# Patient Record
Sex: FEMALE | Race: WHITE | Employment: PART TIME | URBAN - NONMETROPOLITAN AREA
[De-identification: names, ages, dates, MRNs, and addresses within clinical notes are randomized per-mention and may not be internally consistent; named-entity substitution may affect disease eponyms.]

---

## 2024-07-04 ENCOUNTER — HOSPITAL ENCOUNTER (EMERGENCY)
Age: 34
Discharge: HOME | End: 2024-07-04
Attending: EMERGENCY MEDICINE
Payer: MEDICAID

## 2024-07-04 VITALS
TEMPERATURE: 98.1 F | SYSTOLIC BLOOD PRESSURE: 128 MMHG | RESPIRATION RATE: 14 BRPM | DIASTOLIC BLOOD PRESSURE: 90 MMHG | OXYGEN SATURATION: 98 % | HEART RATE: 86 BPM

## 2024-07-04 DIAGNOSIS — L50.9 URTICARIA: Primary | ICD-10-CM

## 2024-07-04 PROCEDURE — 99283 EMERGENCY DEPT VISIT LOW MDM: CPT

## 2024-07-04 RX ORDER — TIZANIDINE 4 MG/1
4 TABLET ORAL 2 TIMES DAILY
COMMUNITY

## 2024-07-04 RX ORDER — FAMOTIDINE 20 MG/1
20 TABLET, FILM COATED ORAL 2 TIMES DAILY
Qty: 20 TABLET | Refills: 0 | Status: SHIPPED | OUTPATIENT
Start: 2024-07-04 | End: 2024-07-14

## 2024-07-04 RX ORDER — METHYLPREDNISOLONE 4 MG/1
TABLET ORAL
Qty: 1 KIT | Refills: 0 | Status: SHIPPED | OUTPATIENT
Start: 2024-07-04 | End: 2024-07-10

## 2024-07-04 RX ORDER — DIPHENHYDRAMINE HCL 25 MG
25 CAPSULE ORAL EVERY 4 HOURS PRN
Qty: 1 CAPSULE | Refills: 0 | Status: SHIPPED | OUTPATIENT
Start: 2024-07-04 | End: 2024-07-14

## 2024-07-04 RX ORDER — BUSPIRONE HYDROCHLORIDE 5 MG/1
5 TABLET ORAL 3 TIMES DAILY
COMMUNITY

## 2024-07-04 ASSESSMENT — ENCOUNTER SYMPTOMS
BACK PAIN: 0
VOMITING: 0
BLOOD IN STOOL: 0
COUGH: 0
DIARRHEA: 0
SHORTNESS OF BREATH: 0
EYE PAIN: 0
NAUSEA: 0
ABDOMINAL PAIN: 0
CONSTIPATION: 0

## 2024-07-04 ASSESSMENT — LIFESTYLE VARIABLES: HOW OFTEN DO YOU HAVE A DRINK CONTAINING ALCOHOL: NEVER

## 2024-07-04 NOTE — ED PROVIDER NOTES
[facility]  eMERGENCY dEPARTMENT eNCOUnter      Pt Name: Becca Ledezma  MRN: [id number]  Birthdate 1990  Date of evaluation: 7/4/2024      CHIEF COMPLAINT       Chief Complaint   Patient presents with    Rash         HISTORY OF PRESENT ILLNESS    Becca Ledezma is a 34 y.o. female who presents with chief complaint of a rash that started this morning she thinks it may have been some mosquitoes her fiancé let into the house she is itching Benadryl has helped mostly swelling to her buttock her face and splotchy rashes to her arms that itch no difficulty breathing no difficulty swallowing  Patient's had a reaction to sulfa before but can think of nothing else new in her life and has not taken sulfa drugs    REVIEW OF SYSTEMS         Review of Systems   Constitutional:  Negative for chills and fever.   HENT:  Negative for congestion and ear pain.    Eyes:  Negative for pain and visual disturbance.   Respiratory:  Negative for cough and shortness of breath.    Cardiovascular:  Negative for chest pain, palpitations and leg swelling.   Gastrointestinal:  Negative for abdominal pain, blood in stool, constipation, diarrhea, nausea and vomiting.   Endocrine: Negative for polydipsia and polyuria.   Genitourinary:  Negative for difficulty urinating, dysuria, frequency, vaginal bleeding and vaginal discharge.   Musculoskeletal:  Negative for back pain, joint swelling, myalgias, neck pain and neck stiffness.   Skin:  Positive for rash.   Neurological:  Negative for dizziness, weakness and headaches.   Hematological:  Negative for adenopathy. Does not bruise/bleed easily.   Psychiatric/Behavioral:  Negative for confusion, self-injury and suicidal ideas.          PAST MEDICAL HISTORY    has no past medical history on file.    SURGICAL HISTORY      has no past surgical history on file.    CURRENT MEDICATIONS       Previous Medications    BUPROPION HCL (WELLBUTRIN PO)    Take 150 mg by mouth daily    BUSPIRONE (BUSPAR) 5

## 2025-06-02 ENCOUNTER — TRANSCRIBE ORDERS (OUTPATIENT)
Dept: PHYSICAL THERAPY | Age: 35
End: 2025-06-02

## 2025-06-02 DIAGNOSIS — M35.7 HYPERMOBILITY SYNDROME: Primary | ICD-10-CM

## 2025-06-04 ASSESSMENT — RHEUMATOLOGY NEW PATIENT QUESTIONNAIRE
COUGH: N
MUSCLE WEAKNESS: Y
EASY BRUISING: Y
PERSISTENT DIARRHEA: N
EYE REDNESS: N
HOW WOULD YOU DESCRIBE YOUR STIFFNESS ON AVERAGE: MODERATE
NIGHT SWEATS: N
UNUSUAL BLEEDING: N
DRYNESS OF MOUTH: N
COLOR CHANGES OF HANDS OR FEET IN THE COLD: Y
DIFFICULTY SWALLOWING: Y
DOUBLE OR BLURRED VISION: N
JOINT SWELLING: N
SORES IN MOUTH OR NOSE: N
VOMITING OF BLOOD OR COFFEE GROUND CONSISTENCY MATERIAL: N
BLACK STOOLS: N
EYE DRYNESS: N
HEARTBURN OR REFLUX: Y
ANEMIA: Y
HOARSE VOICE: N
RASH OR ULCERS: N
SKIN REDNESS: N
DIFFICULTY FALLING ASLEEP: Y
AGITATION: Y
SEIZURES: N
INCREASED SUSCEPTIBILITY TO INFECTION: N
LOSS OF CONSCIOUSNESS: N
STOMACH PAIN: N
UNUSUAL FATIGUE: Y
UNUSUALLY RAPID OR SLOWED HEART RATE: N
NODULES/BUMPS: Y
EYE PAIN: N
NUMBNESS OR TINGLING IN HANDS OR FEET: Y
DIFFICULTY STAYING ASLEEP: Y
MEMORY LOSS: N
SHORTNESS OF BREATH: N
SWOLLEN OR TENDER GLANDS: N
JAUNDICE: N
ANXIETY: Y
DIFFICULTY BREATHING LYING DOWN: N
BLOOD IN STOOLS: N
JOINT PAIN: Y
UNEXPLAINED HEARING LOSS: N
NAUSEA: N
ABNORMAL URINE: N
SWOLLEN LEGS OR FEET: N
SUN SENSITIVE (SUN ALLERGY): N
DEPRESSION: N
EXCESSIVE HAIR LOSS (MORE THAN YOUR NORM): N
FAINTING: Y
VAGINAL DRYNESS: N
LOSS OF VISION: N
BEHAVIORAL CHANGES: N
CHEST PAIN: N
HEADACHES: Y
EASILY LOSING TEMPER: N
MORNING STIFFNESS IN LOWER BACK: Y
MORNING STIFFNESS: Y
UNEXPLAINED WEIGHT CHANGE: N
PAIN OR BURNING ON URINATION: N
RASH: N
FEVER: N

## 2025-06-05 ENCOUNTER — LAB (OUTPATIENT)
Dept: LAB | Age: 35
End: 2025-06-05

## 2025-06-05 ENCOUNTER — OFFICE VISIT (OUTPATIENT)
Age: 35
End: 2025-06-05
Payer: MEDICAID

## 2025-06-05 VITALS
WEIGHT: 149.6 LBS | BODY MASS INDEX: 24.93 KG/M2 | DIASTOLIC BLOOD PRESSURE: 70 MMHG | HEART RATE: 90 BPM | OXYGEN SATURATION: 98 % | SYSTOLIC BLOOD PRESSURE: 108 MMHG | HEIGHT: 65 IN

## 2025-06-05 DIAGNOSIS — M25.50 MULTIPLE JOINT PAIN: Primary | ICD-10-CM

## 2025-06-05 DIAGNOSIS — M25.50 MULTIPLE JOINT PAIN: ICD-10-CM

## 2025-06-05 LAB
ALBUMIN SERPL BCG-MCNC: 4.3 G/DL (ref 3.4–4.9)
ALP SERPL-CCNC: 67 U/L (ref 38–126)
ALT SERPL W/O P-5'-P-CCNC: 11 U/L (ref 10–35)
ANION GAP SERPL CALC-SCNC: 12 MEQ/L (ref 8–16)
AST SERPL-CCNC: 18 U/L (ref 10–35)
BASOPHILS ABSOLUTE: 0 THOU/MM3 (ref 0–0.1)
BASOPHILS NFR BLD AUTO: 0.5 %
BILIRUB SERPL-MCNC: 0.3 MG/DL (ref 0.3–1.2)
BUN SERPL-MCNC: 11 MG/DL (ref 8–23)
CALCIUM SERPL-MCNC: 9.2 MG/DL (ref 8.6–10)
CHLORIDE SERPL-SCNC: 105 MEQ/L (ref 98–111)
CO2 SERPL-SCNC: 24 MEQ/L (ref 22–29)
CREAT SERPL-MCNC: 0.9 MG/DL (ref 0.5–0.9)
CRP SERPL-MCNC: < 0.3 MG/DL (ref 0–0.5)
DEPRECATED RDW RBC AUTO: 39.4 FL (ref 35–45)
EOSINOPHIL NFR BLD AUTO: 1.4 %
EOSINOPHILS ABSOLUTE: 0.1 THOU/MM3 (ref 0–0.4)
ERYTHROCYTE [DISTWIDTH] IN BLOOD BY AUTOMATED COUNT: 11.8 % (ref 11.5–14.5)
ERYTHROCYTE [SEDIMENTATION RATE] IN BLOOD BY WESTERGREN METHOD: 6 MM/HR (ref 0–20)
GFR SERPL CREATININE-BSD FRML MDRD: 87 ML/MIN/1.73M2
GLUCOSE SERPL-MCNC: 92 MG/DL (ref 74–109)
HCT VFR BLD AUTO: 39.5 % (ref 37–47)
HGB BLD-MCNC: 13.3 GM/DL (ref 12–16)
IMM GRANULOCYTES # BLD AUTO: 0.04 THOU/MM3 (ref 0–0.07)
IMM GRANULOCYTES NFR BLD AUTO: 0.4 %
LYMPHOCYTES ABSOLUTE: 2.3 THOU/MM3 (ref 1–4.8)
LYMPHOCYTES NFR BLD AUTO: 23.9 %
MCH RBC QN AUTO: 30.6 PG (ref 26–33)
MCHC RBC AUTO-ENTMCNC: 33.7 GM/DL (ref 32.2–35.5)
MCV RBC AUTO: 91 FL (ref 81–99)
MONOCYTES ABSOLUTE: 0.5 THOU/MM3 (ref 0.4–1.3)
MONOCYTES NFR BLD AUTO: 5.2 %
NEUTROPHILS ABSOLUTE: 6.5 THOU/MM3 (ref 1.8–7.7)
NEUTROPHILS NFR BLD AUTO: 68.6 %
NRBC BLD AUTO-RTO: 0 /100 WBC
PLATELET # BLD AUTO: 316 THOU/MM3 (ref 130–400)
PMV BLD AUTO: 11.6 FL (ref 9.4–12.4)
POTASSIUM SERPL-SCNC: 3.5 MEQ/L (ref 3.5–5.2)
PROT SERPL-MCNC: 7.1 G/DL (ref 6.4–8.3)
RBC # BLD AUTO: 4.34 MILL/MM3 (ref 4.2–5.4)
SODIUM SERPL-SCNC: 141 MEQ/L (ref 135–145)
WBC # BLD AUTO: 9.5 THOU/MM3 (ref 4.8–10.8)

## 2025-06-05 PROCEDURE — 99203 OFFICE O/P NEW LOW 30 MIN: CPT | Performed by: INTERNAL MEDICINE

## 2025-06-05 PROCEDURE — 99204 OFFICE O/P NEW MOD 45 MIN: CPT | Performed by: INTERNAL MEDICINE

## 2025-06-05 RX ORDER — LORATADINE 10 MG/1
10 TABLET ORAL PRN
COMMUNITY

## 2025-06-05 RX ORDER — DICLOFENAC SODIUM 75 MG/1
75 TABLET, DELAYED RELEASE ORAL 2 TIMES DAILY
COMMUNITY

## 2025-06-05 ASSESSMENT — ENCOUNTER SYMPTOMS
NAUSEA: 0
SHORTNESS OF BREATH: 0
COUGH: 0
ABDOMINAL PAIN: 0
VOMITING: 0

## 2025-06-05 NOTE — PROGRESS NOTES
[facility]   Rheumatology Clinic Note      6/5/2025       Reason for Consult:  joint pain  Requesting Physician:  Daphney Romero APRN *     CHIEF COMPLAINT:    Chief Complaint   Patient presents with    New Patient     Joint pain    Joint Pain     Bilateral wrist, ankles c Left ankle worsened, bilateral shoulders c Rt being worsened area,.  Wrist present for 12 years. Had an EMG completed (negative)  Chronic fracture L5  - pain level 6         HISTORY OF PRESENT ILLNESS:    34 y.o. female presents today for evaluation of joint pain.   Pain in wrists (right worse than left) since high school (around 15 years).  Gripping and twisting movements of wrist aggravates the pain. Associated with swelling.    Pain in ankles, left worse than right. Popping. H/o spraining her ankles multiple time when she was in school playing soccer.  Pain in shoulders. Associated with popping and feeling of shoulder getting out of place. Reports that her fiance pushes her shoulder in place.    Chronic fracture in L5, wears a back brace.     Wakes up with stiffness in her joints. Feels like this lasts all day.     Reports having eczema in the face. Uses topical steroid that help.  No other skin rashes.  No oral ulcers.  No Raynaud's.  No SOB.      Past Medical History:     has no past medical history on file.    Past Surgical History:     has no past surgical history on file.    Current Medications:      Current Outpatient Medications:     busPIRone (BUSPAR) 5 MG tablet, Take 1 tablet by mouth 3 times daily, Disp: , Rfl:     tiZANidine (ZANAFLEX) 4 MG tablet, Take 1 tablet by mouth in the morning and at bedtime Pt states she takes at bedtime, Disp: , Rfl:     Cholecalciferol (VITAMIN D-3 PO), Take by mouth daily, Disp: , Rfl:     loratadine (CLARITIN) 10 MG tablet, Take 1 tablet by mouth as needed, Disp: , Rfl:     diclofenac (VOLTAREN) 75 MG EC tablet, Take 1 tablet by mouth 2 times daily, Disp: , Rfl:     famotidine (PEPCID)

## 2025-06-11 ENCOUNTER — HOSPITAL ENCOUNTER (OUTPATIENT)
Dept: PHYSICAL THERAPY | Age: 35
Setting detail: THERAPIES SERIES
Discharge: HOME | End: 2025-06-11
Payer: MEDICAID

## 2025-06-11 PROCEDURE — 97163 PT EVAL HIGH COMPLEX 45 MIN: CPT | Performed by: PHYSICAL THERAPIST

## 2025-06-11 ASSESSMENT — PAIN DESCRIPTION - ORIENTATION: ORIENTATION: RIGHT;LEFT

## 2025-06-11 ASSESSMENT — PAIN SCALES - GENERAL: PAINLEVEL_OUTOF10: 6

## 2025-06-11 ASSESSMENT — PAIN DESCRIPTION - DESCRIPTORS: DESCRIPTORS: TIGHTNESS

## 2025-06-11 ASSESSMENT — PAIN DESCRIPTION - LOCATION: LOCATION: BACK;ANKLE;SHOULDER

## 2025-06-11 ASSESSMENT — PAIN DESCRIPTION - PAIN TYPE: TYPE: CHRONIC PAIN

## 2025-06-11 NOTE — FLOWSHEET NOTE
motor skill, proprioception.  (34197)    Manual Treatments:    [] Provided manual therapy to mobilize soft tissue/joints for the purpose of modulating pain, promoting relaxation,  increasing ROM, reducing/eliminating soft tissue swelling/inflammation/restriction, improving soft tissue extensibility. (11263)    Service Based Modalities:      Timed Code Treatment Minutes:       Total Treatment Minutes:   50    Treatment/Activity Tolerance:  [] Patient tolerated treatment well [] Patient limited by fatique  [x] Patient limited by pain  [] Patient limited by other medical complications  [] Other:     Prognosis: [] Good [x] Fair  [] Poor    Patient Requires Follow-up: [x] Yes  [] No    Goals:  Short Term Goals  Time Frame for Short Term Goals: 3 weeks  Short Term Goal 1: Initaite HEP    Long Term Goals  Time Frame for Long Term Goals : 6 weeks  Long Term Goal 1: Indpendent in HEP  Long Term Goal 2: Improve LE/core strength to 5/5 to improve joint protection and reduce instability.  Long Term Goal 3: Patient to note pain at worst 7/10 with work tasks.  Long Term Goal 4: Reduce Foot Function Index to 35% DIsabiltiy or less to improve toelrance with ADLs    Plan:   [] Continue per plan of care [] Alter current plan (see comments)  [x] Plan of care initiated [] Hold pending MD visit [] Discharge    Plan for Next Session:  Requesting OT referral for bilateral hand pain     Electronically signed by:  Jordan Schmidt, PT

## 2025-06-11 NOTE — PROGRESS NOTES
Pain UE/LE    Therapy Time:   Individual Concurrent Group Co-treatment   Time In 1430         Time Out 1520         Minutes 50                 Jordan Schmidt, PT

## 2025-06-11 NOTE — PLAN OF CARE
Potential: [] Excellent [x] Good [] Fair  [] Poor     Electronically signed by:  Jordan Schmidt PT    If you have any questions or concerns, please don't hesitate to call.  Thank you for your referral.      Physician Signature:________________________________Date:__________________  By signing above, therapist’s plan is approved by physician

## 2025-06-12 ENCOUNTER — TRANSCRIBE ORDERS (OUTPATIENT)
Dept: PHYSICAL THERAPY | Age: 35
End: 2025-06-12

## 2025-06-12 DIAGNOSIS — M25.531 PAIN IN RIGHT WRIST: Primary | ICD-10-CM

## 2025-06-18 ENCOUNTER — HOSPITAL ENCOUNTER (OUTPATIENT)
Dept: PHYSICAL THERAPY | Age: 35
Setting detail: THERAPIES SERIES
Discharge: HOME | End: 2025-06-18
Payer: MEDICAID

## 2025-06-18 PROCEDURE — 97110 THERAPEUTIC EXERCISES: CPT

## 2025-06-18 NOTE — FLOWSHEET NOTE
Physical Therapy Daily Treatment Note    Date:  2025    Patient Name:  Becca Ledezma    :  1990  MRN: [id number]  Restrictions/Precautions:     Medical/Treatment Diagnosis Information:     Hypermobility syndrome [M35.7]      Insurance/Certification information:     Physician Information:    Daphney Romero APRN   Plan of care signed (Y/N):  n  Visit# / total visits:  2/10  Pain level: 6/10       Time In: 9:30  Time Out: 10:13    Progress Note: []  Yes  [x]  No  Next due by: Visit #10  Or by    Subjective:   Pt reports brought cane to learn how to use it. States Monday at work was in a lot of pain. Pain was shooting from back to L foot. 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     Objective: EDMUNDO performed per flow sheet for increased mobility and strengthening for improvement in completion of ADLs. Verbal cueing for sequencing and proper form. Gait trained with straight point cane.counter hep given     Observation:   Test measurements:      Exercises: Focus on joint protection UE/LE  Exercise/Equipment Resistance/Repetitions Other comments        Nustep  5'    Counter Exs on foam  10x 3 way, HR/TR, HS curls, marches     Squat  10x     Step ups 10x 4\" F, L         Side Stepping  3 laps     Tandem Walking  3 laps     Retro walking  2 laps          Standing Foam            Ankle isometrics 10x3\"     Shoulder isometrics  10x3\"      [] Provided verbal/tactile cueing for activities related to strengthening, flexibility, endurance, ROM. (05061)  [] Provided verbal/tactile cueing for activities related to improving balance, coordination, kinesthetic sense, posture, motor skill, proprioception. (35935)    Therapeutic Activities:     [] Therapeutic activities, direct (one-on-one) patient contact (use of dynamic activities to improve functional performance). (66215)    Gait:   [] Provided training and instruction to the patient for ambulation re-education. (73454)    Self-Care/ADL's  []

## 2025-06-25 ENCOUNTER — HOSPITAL ENCOUNTER (OUTPATIENT)
Dept: PHYSICAL THERAPY | Age: 35
Setting detail: THERAPIES SERIES
Discharge: HOME | End: 2025-06-25
Payer: MEDICAID

## 2025-06-25 PROCEDURE — 97110 THERAPEUTIC EXERCISES: CPT

## 2025-06-25 NOTE — FLOWSHEET NOTE
Physical Therapy Daily Treatment Note    Date:  2025    Patient Name:  Becca Ledezma    :  1990  MRN: [id number]  Restrictions/Precautions:     Medical/Treatment Diagnosis Information:     Hypermobility syndrome [M35.7]      Insurance/Certification information:     Physician Information:    Daphney Romero APRN   Plan of care signed (Y/N):  n  Visit# / total visits:  3/10  Pain level: 6/10       Time In: 8:46  Time Out: 9:33    Progress Note: []  Yes  [x]  No  Next due by: Visit #10  Or by  25    Subjective:   States running late and forgot cane. Woke up with more shoulder pain however feels better now. Does not think was very sore after last session.     Objective: EDMUNDO performed per flow sheet for increased mobility and strengthening for improvement in completion of ADLs. Verbal cueing for sequencing and proper form. Initiated several new exercises with good tolerance. Balance exercises show most difficulty    Observation:   Test measurements:      Exercises: Focus on joint protection UE/LE  Exercise/Equipment Resistance/Repetitions Other comments        Nustep  5'    Counter Exs on foam  15x 3 way, HR/TR, HS curls, marches     Squat  10x 3 way    Step ups 10x 4\" F, L         Side Stepping  3 laps     Tandem Walking  3 laps     Retro walking  2 laps          Standing Foam  2x30\" FTEO, FAEC         Ankle isometrics 10x3\"     Shoulder isometrics  10x3\"      [] Provided verbal/tactile cueing for activities related to strengthening, flexibility, endurance, ROM. (29845)  [] Provided verbal/tactile cueing for activities related to improving balance, coordination, kinesthetic sense, posture, motor skill, proprioception. (56029)    Therapeutic Activities:     [] Therapeutic activities, direct (one-on-one) patient contact (use of dynamic activities to improve functional performance). (39967)    Gait:   [] Provided training and instruction to the patient for ambulation re-education.

## 2025-07-01 ENCOUNTER — OFFICE VISIT (OUTPATIENT)
Dept: PRIMARY CARE CLINIC | Age: 35
End: 2025-07-01
Payer: MEDICAID

## 2025-07-01 VITALS
HEART RATE: 89 BPM | TEMPERATURE: 97.4 F | OXYGEN SATURATION: 98 % | DIASTOLIC BLOOD PRESSURE: 80 MMHG | HEIGHT: 65 IN | SYSTOLIC BLOOD PRESSURE: 110 MMHG | BODY MASS INDEX: 25.16 KG/M2 | WEIGHT: 151 LBS

## 2025-07-01 DIAGNOSIS — H10.13 ALLERGIC CONJUNCTIVITIS OF BOTH EYES: Primary | ICD-10-CM

## 2025-07-01 DIAGNOSIS — M79.89 SWELLING OF LEFT HAND: ICD-10-CM

## 2025-07-01 PROCEDURE — 99203 OFFICE O/P NEW LOW 30 MIN: CPT | Performed by: PHYSICIAN ASSISTANT

## 2025-07-01 PROCEDURE — 99212 OFFICE O/P EST SF 10 MIN: CPT | Performed by: PHYSICIAN ASSISTANT

## 2025-07-01 RX ORDER — OLOPATADINE HYDROCHLORIDE 2 MG/ML
1 SOLUTION OPHTHALMIC DAILY
Qty: 1.5 ML | Refills: 0 | Status: SHIPPED | OUTPATIENT
Start: 2025-07-01

## 2025-07-01 SDOH — ECONOMIC STABILITY: FOOD INSECURITY: WITHIN THE PAST 12 MONTHS, YOU WORRIED THAT YOUR FOOD WOULD RUN OUT BEFORE YOU GOT MONEY TO BUY MORE.: NEVER TRUE

## 2025-07-01 SDOH — ECONOMIC STABILITY: FOOD INSECURITY: WITHIN THE PAST 12 MONTHS, THE FOOD YOU BOUGHT JUST DIDN'T LAST AND YOU DIDN'T HAVE MONEY TO GET MORE.: NEVER TRUE

## 2025-07-01 ASSESSMENT — PATIENT HEALTH QUESTIONNAIRE - PHQ9
SUM OF ALL RESPONSES TO PHQ QUESTIONS 1-9: 0
SUM OF ALL RESPONSES TO PHQ QUESTIONS 1-9: 0
1. LITTLE INTEREST OR PLEASURE IN DOING THINGS: NOT AT ALL
SUM OF ALL RESPONSES TO PHQ QUESTIONS 1-9: 0
SUM OF ALL RESPONSES TO PHQ QUESTIONS 1-9: 0
2. FEELING DOWN, DEPRESSED OR HOPELESS: NOT AT ALL

## 2025-07-01 ASSESSMENT — ENCOUNTER SYMPTOMS
EYE DISCHARGE: 0
BLURRED VISION: 0
DIARRHEA: 0
EYE ITCHING: 1
WHEEZING: 0
VOMITING: 0
FOREIGN BODY SENSATION: 1
NAUSEA: 0
EYE REDNESS: 0
EYE PAIN: 0
SHORTNESS OF BREATH: 0

## 2025-07-01 NOTE — PROGRESS NOTES
General: Swelling (mild swelling of left hand) present. No tenderness, deformity or signs of injury.   Skin:     General: Skin is warm and dry.   Neurological:      General: No focal deficit present.      Mental Status: She is alert and oriented to person, place, and time.   Psychiatric:         Mood and Affect: Mood normal.         Behavior: Behavior normal.       Vitals:    07/01/25 0848   BP: 110/80   Pulse: 89   Temp: 97.4 °F (36.3 °C)   TempSrc: Tympanic   SpO2: 98%   Weight: 68.5 kg (151 lb)   Height: 1.651 m (5' 5\")         Assessment:       Diagnosis Orders   1. Allergic conjunctivitis of both eyes        2. Swelling of left hand           On physical exam, the patient has mild swelling of the left hand with no deformity, erythema, wounds, or tenderness. The patient is currently being evaluated by Rheumatology for autoimmune symptoms she has been experiencing. Advised patient to follow up with Rheumatology about the swelling as she has an appointment tomorrow and the swelling may be due to an autoimmune response. The patient also has mild erythema and cobblestoning of her bilateral lower eyelids. The patient has a history of seasonal allergies. Based on this, the patient is being treated for allergic conjunctivitis. Pataday optic solution was prescribed. Advised patient to use warm/cold compresses on eyes as needed. Patient agrees with plan of care. Advised patient to follow up with PCP or walk in if symptoms persist or worsen.           Plan:   Assessment & Plan   Return if symptoms worsen or fail to improve.     No orders of the defined types were placed in this encounter.    Orders Placed This Encounter   Medications    olopatadine (PATADAY) 0.2 % SOLN ophthalmic solution     Sig: Place 1 drop into both eyes daily     Dispense:  1.5 mL     Refill:  0        Patient given educational materials - see patient instructions. Discussed use, benefits, and side effects of prescribed medications with patient.

## 2025-07-02 ENCOUNTER — HOSPITAL ENCOUNTER (OUTPATIENT)
Dept: OCCUPATIONAL THERAPY | Age: 35
Setting detail: THERAPIES SERIES
Discharge: HOME | End: 2025-07-02
Payer: MEDICAID

## 2025-07-02 DIAGNOSIS — M25.531 PAIN IN RIGHT WRIST: Primary | ICD-10-CM

## 2025-07-02 DIAGNOSIS — M25.531 BILATERAL WRIST PAIN: ICD-10-CM

## 2025-07-02 DIAGNOSIS — M25.532 BILATERAL WRIST PAIN: ICD-10-CM

## 2025-07-02 PROCEDURE — 97166 OT EVAL MOD COMPLEX 45 MIN: CPT | Performed by: OCCUPATIONAL THERAPIST

## 2025-07-02 PROCEDURE — 97760 ORTHOTIC MGMT&TRAING 1ST ENC: CPT | Performed by: OCCUPATIONAL THERAPIST

## 2025-07-02 ASSESSMENT — 9 HOLE PEG TEST
TESTTIME_SECONDS: 20
TESTTIME_SECONDS: 26
TEST_RESULT: IMPAIRED
TEST_RESULT: IMPAIRED

## 2025-07-02 NOTE — PLAN OF CARE
Occupational Therapy     [place]  Phone: [phone]  Fax: [phone]             To:  Daphney Romero, APRN - NP      Patient: Becca Ledezma  : 1990  MRN: [id number]  Evaluation Date: 2025      Diagnosis Information:  Diagnosis: Pain in right wrist   OT Insurance Information: [employer] [place] Medicaid     Occupational Therapy Certification/Re-Certification Form  Dear Daphney Romero  The following patient has been evaluated for occupational therapy services and for therapy to continue, insurance requires physician review of the treatment plan. Please review the attached evaluation and/or summary of the patient's plan of care, and verify that you agree therapy should continue by signing the attached document and sending it back to our office.    Plan of Care/Treatment to date:25-25    [x] Therapeutic Exercise    [] Modalities:  [x] Therapeutic Activity    [x] Ultrasound  [x] Electrical Stimulation   [x] Activities of Daily Living    [x] Paraffin   [x] Kinesiotaping  [] Neuromuscular Re-education   [x] Iontophoresis [x] Coldpack/hotpack   [x] Instruction in HEP     [x] Orthotics/splint []   [x] Manual Therapy       [] Aquatic Therapy            Frequency/Duration:    # Days per week: [] 1 day # Weeks: [] 1 week [] 5 weeks      [x] 2 days   [] 2 weeks [x] 6 weeks     [] 3 days   [] 3 weeks [] 7 weeks     [] 4 days   [] 4 weeks [] 8 weeks  Goals:  Short Term Goals  Time Frame for Short Term Goals: 2 weeks  Short Term Goal 1: Orthotics for support and positioning as needed  Short Term Goal 2: Patient education adaptive equipment and technique for increased ease I/ADLs  Short Term Goal 3: Patient education edema management techniques   Long Term Goals  Time Frame for Long Term Goals : 6 weeks  Long Term Goal 1: Patient to demonstrate increased strength BUE: R hand  > 70#, L hand  > 50# for improved I/ADLs  Long Term Goal 2: Patient to demonstrate improved FMC with B 9HPT < 19s  Long Term Goal 3:

## 2025-07-02 NOTE — FLOWSHEET NOTE
[facility] [place] [facility]  Rehabilitation and Sports Medicine    [place]  Phone: [phone]  Fax: [phone]          Occupational Therapy Daily Treatment Note  Date:  2025    Patient Name:  Becca Ledezma    :  1990  MRN: [id number]  Restrictions/Precautions:      Medical/Treatment Diagnosis Information:   Diagnosis: Pain in right wrist   Insurance/Certification information:OT Insurance Information: [employer] [place] Medicaid  Physician Information:  Daphney Romero APRN - NP  Plan of care signed (Y/N):  n    Visit# / total visits:      Pain level: 4/10      Progress Note: [x]  Yes  []  No  Next due by: Visit #10      Date of evaluation/re-evaluation: 25-25    Time In:  405  Time Out:  500    Subjective:     See progress note    Objective/Assessment:   OT eval complete, see progress note    Patient stated (and showed picture of previous day) L hand increased edema. Edema appears to be resolved today. Patient fitted and issued isotoner glove (open finger) for L hand to use at night and prn daytime.  Patient fitted and issued cock up splint R hand/wrist, patient to wear at night and prn daytime. Patient is able to don and doff.    Exercises:   Exercise/Equipment Resistance/Repetitions Other comments                                                                          Therapeutic Exercise  [] Provided verbal/tactile cueing for activities related to strengthening, flexibility, endurance, ROM. (44617)  Neuro  Re-Ed  [] Provided verbal/tactile cueing for activities related to improving balance, coordination, kinesthetic sense, posture, motor skill, proprioception. (21433)     Therapeutic Activities/ADL:   [] Provided use of dynamic activities to improve functional performance (77633)  [] Provided self-care/home management training for activities of daily living and compensatory training (24388)     Manual Treatments:   [] Provided manual therapy to mobilize soft tissue/joints for the purpose of

## 2025-07-02 NOTE — PROGRESS NOTES
Occupational Therapy  Occupational Therapy Initial Assessment  Date:  2025    Patient Name: Becca Ledezma  MRN: [id number]     :  1990     Treatment Diagnosis: pain B wrists    Subjective:  General  Chart Reviewed: Yes  Patient assessed for rehabilitation services?: Yes  History obtained from:: Chart Review, Patient  Family/Caregiver Present: No  Diagnosis: Pain in right wrist  Referring Provider (secondary): Daphney Romero APRN - NP  Follows Commands: Within Functional Limits  OT Visit Information  Onset Date: 25  OT Insurance Information: [employer] [place] Medicaid     Social History:   Social History  Lives With: Significant other, Family  Type of Home: Apartment  Home Layout: One level  Home Access: Stairs to enter with rails  Entrance Stairs - Rails: Right  Entrance Stairs - Number of Steps: 25  Bathroom Shower/Tub: Tub/Shower unit  Bathroom Toilet: Standard  Bathroom Equipment: Shower chair  Functional Status:  Functional Status  Prior level of function: Independent  Occupation: Part time employment  Type of Occupation: --working 6 hours --only able to work 6 hours---approx 25hours per week.  Job Duties: Prolonged standing, Repetitive lifting, Repetitive or prolonged grasping  Prior Level of Assist for ADLs: Independent  Prior Level of Assist for Homemaking: Independent  Homemaking Responsibilities: Yes  Meal Prep Responsibility: Primary  Laundry Responsibility: Primary  Cleaning Responsibility: Primary  Shopping Responsibility: Primary  Ambulation Assistance: Independent  Prior Level of Assist for Transfers: Independent  Active : Yes  Mode of Transportation: [facility]    Objective:   LUE AROM (degrees)  LUE AROM : WNL  Left Hand AROM (degrees)  Left Hand AROM: WNL  RUE AROM (degrees)  RUE AROM : WNL  Right Hand AROM (degrees)  Right Hand AROM: WNL  LUE Strength  Gross LUE Strength: WFL  L Shoulder Flex: 4+/5  L Shoulder Ext: 4+/5  L Shoulder Horiz ABduction: 4+/5  L Shoulder Horiz ADduction:

## 2025-07-09 ENCOUNTER — HOSPITAL ENCOUNTER (OUTPATIENT)
Dept: PHYSICAL THERAPY | Age: 35
Setting detail: THERAPIES SERIES
Discharge: HOME | End: 2025-07-09
Payer: MEDICAID

## 2025-07-09 PROCEDURE — 97110 THERAPEUTIC EXERCISES: CPT | Performed by: PHYSICAL THERAPIST

## 2025-07-09 NOTE — FLOWSHEET NOTE
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  Patient states ran late and was not able to get to appt and needed to R/s.  Patient noting unsure if noting any change in pain since start of PT.       /4 Rheumatology.      Objective: EDMUNDO performed per flow sheet for increased mobility and strengthening for improvement in completion of ADLs. Verbal cueing for sequencing and proper form.  Balance exercises show most difficulty.  Progressed standing exs with foam this date, using no Ue's in hopes to progress balance and avoid pain in wrists.      Observation:   Test measurements:      Exercises: Focus on joint protection UE/LE  Exercise/Equipment Resistance/Repetitions Other comments        Nustep  5'    Counter Exs on foam  15x no Ue's   10x 3 way hip no Ue's  3 way, HR/TR, HS curls, marches     Squat  10x on foam  3 way    Step ups 10x 4\" F, L         Side Stepping  3 laps     Tandem Walking  3 laps     Retro walking  2 laps          Standing Foam  2x30\" FTEO, FAEC         Ankle isometrics      Shoulder isometrics        [] Provided verbal/tactile cueing for activities related to strengthening, flexibility, endurance, ROM. (40008)  [] Provided verbal/tactile cueing for activities related to improving balance, coordination, kinesthetic sense, posture, motor skill, proprioception. (93283)    Therapeutic Activities:     [] Therapeutic activities, direct (one-on-one) patient contact (use of dynamic

## 2025-07-14 ENCOUNTER — HOSPITAL ENCOUNTER (OUTPATIENT)
Dept: OCCUPATIONAL THERAPY | Age: 35
Setting detail: THERAPIES SERIES
Discharge: HOME | End: 2025-07-14
Payer: MEDICAID

## 2025-07-14 PROCEDURE — 97110 THERAPEUTIC EXERCISES: CPT

## 2025-07-14 PROCEDURE — 97530 THERAPEUTIC ACTIVITIES: CPT

## 2025-07-14 NOTE — FLOWSHEET NOTE
[facility] [place] [facility]  Rehabilitation and Sports Medicine    [place]  Phone: [phone]  Fax: [phone]          Occupational Therapy Daily Treatment Note  Date:  2025    Patient Name:  Becca Ledezma    :  1990  MRN: [id number]  Restrictions/Precautions:      Medical/Treatment Diagnosis Information:   Diagnosis: Pain in right wrist   Insurance/Certification information:OT Insurance Information: [employer] [place] Medicaid  Physician Information:  Daphney Romero APRN - NP  Plan of care signed (Y/N):  n    Visit# / total visits:      Pain level: 6/10      Progress Note: []  Yes  [x]  No  Next due by: Visit #10      Date of evaluation/re-evaluation: 25-25    Time In:  347   Time Out: 425    Subjective:     Patient arrived for OT with good affect. Patient donning isotoner glove and wrist wrap on L hand; glove and wrist brace on R hand.  Patient states increased soreness throughout R wrist and around thumb.    Objective/Assessment:   Educated on built-up utensils as well as cushion for cane   Issued A/AROM (see flowsheet)    Exercises:   Exercise/Equipment Resistance/Repetitions Other comments   Prayer Stretch Hold 10s                    5x HEP   Reverse Prayer Hold 10s                    5x HEP   Tendon Glides                                 10x HEP                                                           Therapeutic Exercise  [x] Provided verbal/tactile cueing for activities related to strengthening, flexibility, endurance, ROM. (23559)  Neuro  Re-Ed  [] Provided verbal/tactile cueing for activities related to improving balance, coordination, kinesthetic sense, posture, motor skill, proprioception. (63552)     Therapeutic Activities/ADL:   [x] Provided use of dynamic activities to improve functional performance (10366)  [] Provided self-care/home management training for activities of daily living and compensatory training (33248)     Manual Treatments:   [] Provided manual therapy to

## 2025-07-16 ENCOUNTER — HOSPITAL ENCOUNTER (OUTPATIENT)
Dept: OCCUPATIONAL THERAPY | Age: 35
Setting detail: THERAPIES SERIES
Discharge: HOME | End: 2025-07-16
Payer: MEDICAID

## 2025-07-16 PROCEDURE — 97530 THERAPEUTIC ACTIVITIES: CPT

## 2025-07-16 PROCEDURE — 97035 APP MDLTY 1+ULTRASOUND EA 15: CPT

## 2025-07-16 PROCEDURE — 97110 THERAPEUTIC EXERCISES: CPT

## 2025-07-16 NOTE — FLOWSHEET NOTE
daily living and compensatory training (59139)     Manual Treatments:   [] Provided manual therapy to mobilize soft tissue/joints for the purpose of modulating pain, promoting relaxation, increasing ROM, reducing/eliminating soft tissue swelling/inflammation/restriction, improving soft tissue extensibility. (33044)     Orthotic Management:   [] Provided assessment and fitting orthotic device for improved functional performance. (03632)    Service Based Modalities:  8 U/S    Timed Code Treatment Minutes:   20 therapeutic activity  22 therapeutic exercise    Total Treatment Minutes:   50    Treatment/Activity Tolerance:  [] Patient tolerated treatment well [] Patient limited by fatique  [x] Patient limited by pain  [] Patient limited by other medical complications  [] Other:     Prognosis: [] Good [x] Fair  [] Poor    Patient Requires Follow-up: [x] Yes  [] No      Goals:   Short Term Goals  Time Frame for Short Term Goals: 2 weeks  Short Term Goal 1: Orthotics for support and positioning as needed  Short Term Goal 2: Patient education adaptive equipment and technique for increased ease I/ADLs  Short Term Goal 3: Patient education edema management techniques   Long Term Goals  Time Frame for Long Term Goals : 6 weeks  Long Term Goal 1: Patient to demonstrate increased strength BUE: R hand  > 70#, L hand  > 50# for improved I/ADLs  Long Term Goal 2: Patient to demonstrate improved FMC with B 9HPT < 19s  Long Term Goal 3: Patient to verbalize decreased pain BUE < 2/10 with use/movement  Long Term Goal 4: Patient to demonstrate improved functional use with upper extremity functional index > 60/80    Plan:   [x] Continue per plan of care [] Alter current plan (see comments)  [] Plan of care initiated [] Hold pending MD visit [] Discharge    Plan for Next Session:      Electronically signed by:  COTY Batista

## 2025-07-18 ENCOUNTER — HOSPITAL ENCOUNTER (EMERGENCY)
Age: 35
Discharge: HOME | End: 2025-07-19
Attending: EMERGENCY MEDICINE
Payer: MEDICAID

## 2025-07-18 DIAGNOSIS — M62.838 SPASM OF MUSCLE: Primary | ICD-10-CM

## 2025-07-18 PROCEDURE — 99284 EMERGENCY DEPT VISIT MOD MDM: CPT

## 2025-07-18 ASSESSMENT — PAIN DESCRIPTION - ORIENTATION: ORIENTATION: LEFT

## 2025-07-18 ASSESSMENT — PAIN SCALES - GENERAL: PAINLEVEL_OUTOF10: 9

## 2025-07-18 ASSESSMENT — PAIN - FUNCTIONAL ASSESSMENT: PAIN_FUNCTIONAL_ASSESSMENT: 0-10

## 2025-07-18 ASSESSMENT — PAIN DESCRIPTION - LOCATION: LOCATION: SHOULDER

## 2025-07-19 VITALS
HEART RATE: 62 BPM | BODY MASS INDEX: 23.3 KG/M2 | TEMPERATURE: 98 F | WEIGHT: 140 LBS | RESPIRATION RATE: 16 BRPM | OXYGEN SATURATION: 98 % | DIASTOLIC BLOOD PRESSURE: 56 MMHG | SYSTOLIC BLOOD PRESSURE: 92 MMHG

## 2025-07-19 PROCEDURE — 96372 THER/PROPH/DIAG INJ SC/IM: CPT

## 2025-07-19 PROCEDURE — 6360000002 HC RX W HCPCS: Performed by: EMERGENCY MEDICINE

## 2025-07-19 RX ORDER — KETOROLAC TROMETHAMINE 30 MG/ML
30 INJECTION, SOLUTION INTRAMUSCULAR; INTRAVENOUS ONCE
Status: COMPLETED | OUTPATIENT
Start: 2025-07-19 | End: 2025-07-19

## 2025-07-19 RX ORDER — ORPHENADRINE CITRATE 30 MG/ML
60 INJECTION INTRAMUSCULAR; INTRAVENOUS ONCE
Status: COMPLETED | OUTPATIENT
Start: 2025-07-19 | End: 2025-07-19

## 2025-07-19 RX ADMIN — KETOROLAC TROMETHAMINE 30 MG: 30 INJECTION, SOLUTION INTRAMUSCULAR at 00:13

## 2025-07-19 RX ADMIN — ORPHENADRINE CITRATE 60 MG: 60 INJECTION INTRAMUSCULAR; INTRAVENOUS at 00:14

## 2025-07-19 ASSESSMENT — PAIN DESCRIPTION - LOCATION: LOCATION: SHOULDER

## 2025-07-19 ASSESSMENT — ENCOUNTER SYMPTOMS
NAUSEA: 0
ABDOMINAL PAIN: 0
SHORTNESS OF BREATH: 0
VOMITING: 0

## 2025-07-19 ASSESSMENT — PAIN DESCRIPTION - ORIENTATION: ORIENTATION: LEFT

## 2025-07-19 ASSESSMENT — PAIN SCALES - GENERAL: PAINLEVEL_OUTOF10: 9

## 2025-07-19 NOTE — DISCHARGE INSTRUCTIONS
Follow-up with your PCP within 2 days.  Return to the emergency department for increasing pain, fevers, new numbness or weakness, decreased range of motion, chest pain or shortness of breath, passing or passing out episodes, or any other acute concerns.

## 2025-07-19 NOTE — ED PROVIDER NOTES
[facility] Emergency Department  [address] [facility] [address]  Phone: [phone]  eMERGENCY dEPARTMENT eNCOUnter      Pt Name: Becca Ledezma  MRN: [id number]  Birthdate 1990  Date of evaluation: 7/19/25      CHIEF COMPLAINT     Chief Complaint   Patient presents with    Shoulder Pain     Pt c/o lt shoulder pain, awoke with it yesterday morning, getting tighter as time as went on, able to raise arm forward unable to raise arm up sideways, NAD ntoed       HISTORY OF PRESENT ILLNESS    Becca Ledezma is a 35 y.o. female with a pertinent past medical history of  has no past medical history on file., who presents to the Emergency Department to the emergency department with shoulder pain and pain to the left upper neck and upper arm.  Patient indicates that she woke up like this yesterday morning.  She did not have any specific injury.  No associated numbness or weakness.  No associated fevers or chills.  Denies history of diabetes or immunosuppression.  She does use a cane for lumbar back problems.  Patient took her diclofenac in the morning as well as acetaminophen and then tried Zanaflex in the evening.  No associated numbness or weakness.    REVIEW OF SYSTEMS       Review of Systems   Constitutional:  Negative for fever.   Respiratory:  Negative for shortness of breath.    Cardiovascular:  Negative for chest pain.        Shoulder pain exacerbated with deep breath.   Gastrointestinal:  Negative for abdominal pain, nausea and vomiting.   Neurological:  Negative for weakness and numbness.        PAST MEDICAL HISTORY    has no past medical history on file.    SURGICAL HISTORY      has no past surgical history on file.    CURRENT MEDICATIONS       Discharge Medication List as of 7/19/2025 12:13 AM        CONTINUE these medications which have NOT CHANGED    Details   olopatadine (PATADAY) 0.2 % SOLN ophthalmic solution Place 1 drop into both eyes daily, Disp-1.5 mL, R-0Normal      loratadine (CLARITIN) 10 MG

## 2025-07-21 ENCOUNTER — HOSPITAL ENCOUNTER (OUTPATIENT)
Dept: OCCUPATIONAL THERAPY | Age: 35
Setting detail: THERAPIES SERIES
Discharge: HOME | End: 2025-07-21
Payer: MEDICAID

## 2025-07-21 PROCEDURE — 97035 APP MDLTY 1+ULTRASOUND EA 15: CPT | Performed by: OCCUPATIONAL THERAPIST

## 2025-07-21 PROCEDURE — 97140 MANUAL THERAPY 1/> REGIONS: CPT | Performed by: OCCUPATIONAL THERAPIST

## 2025-07-21 PROCEDURE — 97014 ELECTRIC STIMULATION THERAPY: CPT | Performed by: OCCUPATIONAL THERAPIST

## 2025-07-21 PROCEDURE — 97530 THERAPEUTIC ACTIVITIES: CPT | Performed by: OCCUPATIONAL THERAPIST

## 2025-07-21 NOTE — FLOWSHEET NOTE
[facility] [place] [facility]  Rehabilitation and Sports Medicine    [place]  Phone: [phone]  Fax: [phone]          Occupational Therapy Daily Treatment Note  Date:  2025    Patient Name:  Becca Ledezma    :  1990  MRN: [id number]  Restrictions/Precautions:      Medical/Treatment Diagnosis Information:   Diagnosis: Pain in right wrist   Insurance/Certification information:OT Insurance Information: [employer] [place] [employer]  Physician Information:  Daphney Romero APRN - NP  Plan of care signed (Y/N):  n    Visit# / total visits:      Pain level: 6/10      Progress Note: []  Yes  [x]  No  Next due by: Visit #10      Date of evaluation/re-evaluation: 25-25    Time In:  350   Time Out: 445    Subjective:     Patient arrived for OT with good affect. Patient donning isotoner glove and thumb spica splint on R hand; glove on L hand.  Patient also upgraded cushion on cane handle for increased comfort while using.    Objective/Assessment:   U/S 8 min 1.0 w/cm2 3.0 mHz R wrist/thumb  E-stim with CP 10 min    Exercises:   Exercise/Equipment Resistance/Repetitions Other comments   Prayer Stretch Hold 10s                   5x HEP   Reverse Prayer Hold 10s                   5x HEP   Tendon Glides                                 10x HEP   Wrist Maze                                 10x    Flexbar Red                         15x N, U   Hand Gripper 5 red                        15x R and L hands        Wrist PREs  1#                            10x 4 positions    flex/ext, RD/UD, pron/sup   Elbow PREs 2#                            10x 3 positions                              Therapeutic Exercise  [x] Provided verbal/tactile cueing for activities related to strengthening, flexibility, endurance, ROM. (76201)  Neuro  Re-Ed  [] Provided verbal/tactile cueing for activities related to improving balance, coordination, kinesthetic sense, posture, motor skill, proprioception. (56530)     Therapeutic Activities/ADL:

## 2025-07-23 ENCOUNTER — HOSPITAL ENCOUNTER (OUTPATIENT)
Dept: OCCUPATIONAL THERAPY | Age: 35
Setting detail: THERAPIES SERIES
Discharge: HOME | End: 2025-07-23
Payer: MEDICAID

## 2025-07-23 ENCOUNTER — HOSPITAL ENCOUNTER (OUTPATIENT)
Dept: PHYSICAL THERAPY | Age: 35
Setting detail: THERAPIES SERIES
Discharge: HOME | End: 2025-07-23
Payer: MEDICAID

## 2025-07-23 PROCEDURE — 97035 APP MDLTY 1+ULTRASOUND EA 15: CPT

## 2025-07-23 PROCEDURE — 97110 THERAPEUTIC EXERCISES: CPT

## 2025-07-23 PROCEDURE — 97014 ELECTRIC STIMULATION THERAPY: CPT

## 2025-07-23 PROCEDURE — 97530 THERAPEUTIC ACTIVITIES: CPT

## 2025-07-23 NOTE — FLOWSHEET NOTE
[facility]  Rehabilitation and Sports Medicine    [place]  Phone: [phone]  Fax: [phone]          Occupational Therapy Daily Treatment Note  Date:  2025    Patient Name:  Becca Ledezma    :  1990  MRN: [id number]  Restrictions/Precautions:      Medical/Treatment Diagnosis Information:   Diagnosis: Pain in right wrist   Insurance/Certification information:OT Insurance Information: [employer] [place] [employer]  Physician Information:  Daphney Romero APRN - NP  Plan of care signed (Y/N):  n    Visit# / total visits:      Pain level: -6/10      Progress Note: []  Yes  [x]  No  Next due by: Visit #10      Date of evaluation/re-evaluation: 25-25    Time In: 1109   Time Out: 1209    Subjective:     Patient arrived for OT with good affect. Patient donning isotoner glove and thumb spica splint on R hand; glove on L hand.  Patient reports having \"achy\" pain in R thumb as well as R small finger.    Objective/Assessment:   U/S 8 min 1.0 w/cm2 3.0 mHz R wrist/thumb  E-stim with CP 10 min  Issued red theraputty to further increase R  strength      Strength  R: 38# (55# at eval)  L: 50# (45# at eval)    9-HPT  R: 22s (26s at eval)  L: 19s (20s at eval)    Exercises:   Exercise/Equipment Resistance/Repetitions Other comments   Prayer Stretch Hold 10s                   5x HEP   Wrist Flexion Stretch Hold 10s                   5x HEP   Tendon Glides                                 10x HEP   Wrist Maze                                 10x    Flexbar Red                         15x N, U   Hand Gripper 5 red                        15x R and L hands   Putty Red                          10x , Roll/Pinch, Extend - HEP   Wrist PREs  1#                            10x 4 positions    flex/ext, RD/UD, pron/sup   Elbow PREs 2#                            10x 3 positions                              Therapeutic Exercise  [x] Provided verbal/tactile cueing for activities related to strengthening,

## 2025-07-23 NOTE — FLOWSHEET NOTE
Physical Therapy Daily Treatment Note    Date:  2025    Patient Name:  Becca Ledezma    :  1990  MRN: [id number]  Restrictions/Precautions:     Medical/Treatment Diagnosis Information:     Hypermobility syndrome [M35.7]      Insurance/Certification information:     Physician Information:    Daphney Romero APRN   Plan of care signed (Y/N):  n  Visit# / total visits:  5/10  Pain level: 4-5/10       Time In: 117  Time Out: 200    Progress Note: []  Yes  [x]  No  Next due by: Visit #10  Or by  25    Subjective:   Patient reports she went to the ER over the weekend for left shoulder pain/neck muscle spasms. Symptoms have slowly subsided. Overall noticing improvements since starting PT.     Rheumatology.      Objective: EDMUNDO performed per flow sheet for increased mobility and strengthening for improvement in completion of ADLs. Verbal cueing for sequencing and proper form.  Balance exercises show most difficulty.  Progressed standing exs with foam this date, using no Ue's in hopes to progress balance and avoid pain in wrists.  Checked goals for POC. Provided standing counter HEP handout this date.     Observation:   Test measurements:    Pain averages 4-5/10, at worst 10/10.   Foot function index: 50%     LE Strength   R Hip Flexion: 4+/5  R Knee Flexion: 4+/5  R Knee Extension: 5/5    L Hip Flexion: 4+/5  L Knee Flexion: 5/5  L Knee Extension: 4+/5      Exercises: Focus on joint protection UE/LE  Exercise/Equipment Resistance/Repetitions Other comments        Nustep  5'    Counter Exs on foam  15x no Ue's   10x 3 way hip no Ue's  3 way, HR/TR, HS curls, marches   HEP    Squat  10x on foam  3 way  HEP    Step ups 10x 4\" F, L         Side Stepping  3 laps     Tandem Walking  3 laps     Retro walking  2 laps          Standing Foam  2x30\" FTEO, FAEC         Ankle isometrics      Shoulder isometrics  5\" x 10       [] Provided verbal/tactile cueing for activities related to strengthening, flexibility,

## 2025-07-25 NOTE — PLAN OF CARE
[facility] [facility]  Rehabilitation and Sports Medicine    [x] [place]  Phone: [phone]  Fax: [phone]      [] [place]  Phone: [phone]  Fax: [phone]    Physical Therapy Progress Note  Date: 2025        Patient Name:  Becca Ledezma    :  1990  MRN: [id number]  Restrictions/Precautions:     Medical/Treatment Diagnosis Information:     Hypermobility syndrome [M35.7]     Insurance/Certification information:     Physician Information:    Daphney Romero APRN   Plan of care signed (Y/N):  n  Visit# / total visits:  5/10  Pain level:      4-5/10      Time Period for Report:  25-25  Cancels/No-shows to date:  0    Plan of Care/Treatment to date:  [x] Therapeutic Exercise    [x] Modalities:  [x] Therapeutic Activity     [] Ultrasound  [] Electrical Stimulation  [x] Gait Training      [] Cervical Traction    [] Lumbar Traction  [x] Neuromuscular Re-education  [] Cold/hotpack [] Iontophoresis  [x] Instruction in HEP      Other:  [x] Manual Therapy       []    [] Aquatic Therapy       []                           Subjective:       Patient reports she went to the ER over the weekend for left shoulder pain/neck muscle spasms. Symptoms have slowly subsided. Overall noticing improvements since starting PT.     Rheumatology.        Objective: EDMUNDO performed per flow sheet for increased mobility and strengthening for improvement in completion of ADLs. Verbal cueing for sequencing and proper form.  Balance exercises show most difficulty.  Progressed standing exs with foam this date, using no Ue's in hopes to progress balance and avoid pain in wrists.  Checked goals for POC. Provided standing counter HEP handout this date.      Observation:   Test measurements:    Pain averages 4-5/10, at worst 10/10.   Foot function index: 50%      LE Strength   R Hip Flexion: 4+/5  R Knee Flexion: 4+/5  R Knee Extension: 5/5     L Hip Flexion: 4+/5  L Knee Flexion: 5/5  L Knee Extension:

## 2025-07-29 ENCOUNTER — HOSPITAL ENCOUNTER (OUTPATIENT)
Dept: PHYSICAL THERAPY | Age: 35
Setting detail: THERAPIES SERIES
Discharge: HOME | End: 2025-07-29
Payer: MEDICAID

## 2025-07-29 ENCOUNTER — HOSPITAL ENCOUNTER (OUTPATIENT)
Dept: OCCUPATIONAL THERAPY | Age: 35
Setting detail: THERAPIES SERIES
Discharge: HOME | End: 2025-07-29
Payer: MEDICAID

## 2025-07-29 PROCEDURE — 97014 ELECTRIC STIMULATION THERAPY: CPT

## 2025-07-29 PROCEDURE — 97110 THERAPEUTIC EXERCISES: CPT

## 2025-07-29 PROCEDURE — 97530 THERAPEUTIC ACTIVITIES: CPT

## 2025-07-29 PROCEDURE — 97035 APP MDLTY 1+ULTRASOUND EA 15: CPT

## 2025-07-29 NOTE — FLOWSHEET NOTE
[facility] [place] [facility]  Rehabilitation and Sports Medicine    [place]  Phone: [phone]  Fax: [phone]          Occupational Therapy Daily Treatment Note  Date:  2025    Patient Name:  Becca Ledezma    :  1990  MRN: [id number]  Restrictions/Precautions:      Medical/Treatment Diagnosis Information:   Diagnosis: Pain in right wrist   Insurance/Certification information:OT Insurance Information: [employer] [place] [employer]  Physician Information:  Daphney Romero APRN - NP  Plan of care signed (Y/N):  n    Visit# / total visits:      Pain level: 4/10      Progress Note: []  Yes  [x]  No  Next due by: Visit #10      Date of evaluation/re-evaluation: 25-25    Time In: 350   Time Out: 430    Subjective:     Patient arrived for OT with good affect. Patient donning isotoner glove and thumb spica splint on R hand; glove on L hand.  Patient reports getting increased pain while working yesterday; decreased pain this date on her day off.    Objective/Assessment:   U/S 8 min 1.0 w/cm2 3.0 mHz R wrist/thumb  E-stim with CP 10 min      Exercises:   Exercise/Equipment Resistance/Repetitions Other comments   Prayer Stretch Hold 10s                   5x HEP   Wrist Flexion Stretch Hold 10s                   5x HEP   Tendon Glides                                 10x HEP   Wrist Maze                                 10x    Flexbar Red                         15x N, U   Hand Gripper 5 red                        15x R and L hands   Putty Red                          10x , Roll/Pinch, Extend - HEP                             Therapeutic Exercise  [x] Provided verbal/tactile cueing for activities related to strengthening, flexibility, endurance, ROM. (84732)  Neuro  Re-Ed  [] Provided verbal/tactile cueing for activities related to improving balance, coordination, kinesthetic sense, posture, motor skill, proprioception. (84666)     Therapeutic Activities/ADL:   [x] Provided use of dynamic activities to improve

## 2025-07-29 NOTE — FLOWSHEET NOTE
Physical Therapy Daily Treatment Note    Date:  2025    Patient Name:  Becca Ledezma    :  1990  MRN: [id number]  Restrictions/Precautions:     Medical/Treatment Diagnosis Information:     Hypermobility syndrome [M35.7]      Insurance/Certification information:     Physician Information:    Daphney Romero APRN   Plan of care signed (Y/N):  n  Visit# / total visits:  1/10 2nd POC  Total: 6  Pain level: 4-5/10       Time In: 4:32  Time Out: 5:10    Progress Note: []  Yes  [x]  No  Next due by: Visit #10  Or by  25    Subjective:   Pt reports doing well. Balance is challenging in PT however does not get too sore.      Rheumatology.      Objective: EDMUNDO performed per flow sheet for increased mobility and strengthening for improvement in completion of ADLs. Verbal cueing for sequencing and proper form.  Balance exercises show most difficulty.  Progressed standing exs with foam this date, using no Ue's in hopes to progress balance and avoid pain in wrists.      Observation:   Test measurements:        Exercises: Focus on joint protection UE/LE  Exercise/Equipment Resistance/Repetitions Other comments        Nustep  5'    Counter Exs on foam  15x no Ue's   10x 3 way hip no Ue's  3 way, HR/TR, HS curls, marches   HEP    Squat  10x on foam  3 way  HEP    Step ups 10x 4\" F, L         Side Stepping  3 laps     Tandem Walking  3 laps     Retro walking  2 laps          Standing Foam  2x30\" FTEO, FAEC         Ankle isometrics      Shoulder isometrics  5\" x 10       [] Provided verbal/tactile cueing for activities related to strengthening, flexibility, endurance, ROM. (68176)  [] Provided verbal/tactile cueing for activities related to improving balance, coordination, kinesthetic sense, posture, motor skill, proprioception. (86260)    Therapeutic Activities:     [] Therapeutic activities, direct (one-on-one) patient contact (use of dynamic activities to improve functional performance). (57451)    Gait:   []

## 2025-07-31 ENCOUNTER — HOSPITAL ENCOUNTER (OUTPATIENT)
Dept: OCCUPATIONAL THERAPY | Age: 35
Setting detail: THERAPIES SERIES
Discharge: HOME | End: 2025-07-31
Payer: MEDICAID

## 2025-07-31 ENCOUNTER — HOSPITAL ENCOUNTER (OUTPATIENT)
Dept: PHYSICAL THERAPY | Age: 35
Setting detail: THERAPIES SERIES
Discharge: HOME | End: 2025-07-31
Payer: MEDICAID

## 2025-07-31 PROCEDURE — 97035 APP MDLTY 1+ULTRASOUND EA 15: CPT

## 2025-07-31 PROCEDURE — 97530 THERAPEUTIC ACTIVITIES: CPT

## 2025-07-31 PROCEDURE — 97110 THERAPEUTIC EXERCISES: CPT

## 2025-07-31 PROCEDURE — 97014 ELECTRIC STIMULATION THERAPY: CPT

## 2025-07-31 NOTE — FLOWSHEET NOTE
[facility] [place] [facility]  Rehabilitation and Sports Medicine    [place]  Phone: [phone]  Fax: [phone]          Occupational Therapy Daily Treatment Note  Date:  2025    Patient Name:  Becca Ledezma    :  1990  MRN: [id number]  Restrictions/Precautions:      Medical/Treatment Diagnosis Information:   Diagnosis: Pain in right wrist   Insurance/Certification information:OT Insurance Information: [employer] [place] [employer]  Physician Information:  Daphney Romero APRN - NP  Plan of care signed (Y/N):  n    Visit# / total visits:      Pain level: Denies at rest; 2-3/10 with use/movement     Progress Note: []  Yes  [x]  No  Next due by: Visit #10      Date of evaluation/re-evaluation: 25-25    Time In: 345   Time Out: 433    Subjective:     Patient arrived for OT with good affect. Patient donning isotoner glove and thumb spica splint on R hand; glove on L hand.    Objective/Assessment:   U/S 8 min 1.0 w/cm2 3.0 mHz R wrist/thumb  E-stim with CP 10 min R wrist/thumb     Strength  R: 55# (55# at eval)  L: 60# (45# at eval)     9-HPT  R: 19s (26s at eval)  L: 18s (20s at eval)    Exercises:   Exercise/Equipment Resistance/Repetitions Other comments   Prayer Stretch Hold 10s                   5x HEP   Wrist Flexion Stretch Hold 10s                   5x HEP   Tendon Glides                                 10x HEP   Wrist Maze                                 10x    Flexbar Red                         15x N, U   Hand Gripper 5 red                        15x R and L hands   Putty Red                          10x , Roll/Pinch, Extend - HEP   Pinch Pins Red                         20x                           Therapeutic Exercise  [x] Provided verbal/tactile cueing for activities related to strengthening, flexibility, endurance, ROM. (17281)  Neuro  Re-Ed  [] Provided verbal/tactile cueing for activities related to improving balance, coordination, kinesthetic sense, posture, motor skill,

## 2025-07-31 NOTE — FLOWSHEET NOTE
Physical Therapy Daily Treatment Note    Date:  2025    Patient Name:  Becca Ledezma    :  1990  MRN: [id number]  Restrictions/Precautions:     Medical/Treatment Diagnosis Information:     Hypermobility syndrome [M35.7]      Insurance/Certification information:     Physician Information:    Daphney Romero APRN   Plan of care signed (Y/N):  n  Visit# / total visits:  2/10 2nd POC  Total: 7  Pain level: 4-5/10       Time In: 4:35  Time Out: 5:17    Progress Note: []  Yes  [x]  No  Next due by: Visit #10  Or by  25    Subjective:   pt reports R knee has been bothersome. Feels like there is pressure buildup.      Rheumatology.      Objective: EDMUNDO performed per flow sheet for increased mobility and strengthening for improvement in completion of ADLs. Verbal cueing for sequencing and proper form.  Balance exercises show most difficulty.  Progressed standing exs with foam this date, using no Ue's in hopes to progress balance and avoid pain in wrists.      Observation:   Test measurements:         Exercises: Focus on joint protection UE/LE  Exercise/Equipment Resistance/Repetitions Other comments        Nustep  5'    Counter Exs on foam  15x no Ue's   10x 3 way hip no Ue's  3 way, HR/TR, HS curls, marches   HEP    Squat  10x on foam  3 way  HEP    Step ups 10x 6\" F, L         Side Stepping  3 laps     Tandem Walking  3 laps     Retro walking  2 laps          Standing Foam  2x30\" FTEO, FAEC   Tandem stance  NEXT    TB SPOs 10x red           Ankle isometrics      Shoulder isometrics  5\" x 10       [] Provided verbal/tactile cueing for activities related to strengthening, flexibility, endurance, ROM. (85355)  [] Provided verbal/tactile cueing for activities related to improving balance, coordination, kinesthetic sense, posture, motor skill, proprioception. (86400)    Therapeutic Activities:     [] Therapeutic activities, direct (one-on-one) patient contact (use of dynamic activities to improve functional

## 2025-08-04 ENCOUNTER — OFFICE VISIT (OUTPATIENT)
Age: 35
End: 2025-08-04
Payer: MEDICAID

## 2025-08-04 ENCOUNTER — OFFICE VISIT (OUTPATIENT)
Dept: PAIN MANAGEMENT | Age: 35
End: 2025-08-04
Payer: MEDICAID

## 2025-08-04 VITALS
HEIGHT: 65 IN | SYSTOLIC BLOOD PRESSURE: 116 MMHG | OXYGEN SATURATION: 99 % | HEART RATE: 93 BPM | WEIGHT: 146.7 LBS | BODY MASS INDEX: 24.44 KG/M2 | DIASTOLIC BLOOD PRESSURE: 78 MMHG

## 2025-08-04 VITALS
HEART RATE: 80 BPM | DIASTOLIC BLOOD PRESSURE: 64 MMHG | BODY MASS INDEX: 24.49 KG/M2 | HEIGHT: 65 IN | SYSTOLIC BLOOD PRESSURE: 106 MMHG | WEIGHT: 147 LBS

## 2025-08-04 DIAGNOSIS — L50.9 URTICARIA: ICD-10-CM

## 2025-08-04 DIAGNOSIS — M48.07 NEUROFORAMINAL STENOSIS OF LUMBOSACRAL SPINE: ICD-10-CM

## 2025-08-04 DIAGNOSIS — M25.531 BILATERAL WRIST PAIN: ICD-10-CM

## 2025-08-04 DIAGNOSIS — R42 DIZZINESS: Primary | ICD-10-CM

## 2025-08-04 DIAGNOSIS — M25.532 BILATERAL WRIST PAIN: ICD-10-CM

## 2025-08-04 DIAGNOSIS — M47.816 LUMBAR FACET JOINT SYNDROME: Primary | ICD-10-CM

## 2025-08-04 DIAGNOSIS — R61 HYPERHIDROSIS: ICD-10-CM

## 2025-08-04 PROCEDURE — 99213 OFFICE O/P EST LOW 20 MIN: CPT | Performed by: PHYSICAL MEDICINE & REHABILITATION

## 2025-08-04 PROCEDURE — 99214 OFFICE O/P EST MOD 30 MIN: CPT | Performed by: INTERNAL MEDICINE

## 2025-08-04 PROCEDURE — 99213 OFFICE O/P EST LOW 20 MIN: CPT | Performed by: INTERNAL MEDICINE

## 2025-08-04 PROCEDURE — 99204 OFFICE O/P NEW MOD 45 MIN: CPT | Performed by: PHYSICAL MEDICINE & REHABILITATION

## 2025-08-06 ENCOUNTER — HOSPITAL ENCOUNTER (OUTPATIENT)
Dept: OCCUPATIONAL THERAPY | Age: 35
Setting detail: THERAPIES SERIES
Discharge: HOME | End: 2025-08-06
Payer: MEDICAID

## 2025-08-06 ENCOUNTER — HOSPITAL ENCOUNTER (OUTPATIENT)
Dept: PHYSICAL THERAPY | Age: 35
Setting detail: THERAPIES SERIES
Discharge: HOME | End: 2025-08-06
Payer: MEDICAID

## 2025-08-06 PROCEDURE — 97035 APP MDLTY 1+ULTRASOUND EA 15: CPT

## 2025-08-06 PROCEDURE — 97530 THERAPEUTIC ACTIVITIES: CPT

## 2025-08-06 PROCEDURE — 97110 THERAPEUTIC EXERCISES: CPT

## 2025-08-11 ENCOUNTER — HOSPITAL ENCOUNTER (OUTPATIENT)
Dept: PHYSICAL THERAPY | Age: 35
Setting detail: THERAPIES SERIES
Discharge: HOME | End: 2025-08-11
Payer: MEDICAID

## 2025-08-11 PROCEDURE — 97110 THERAPEUTIC EXERCISES: CPT | Performed by: PHYSICAL THERAPY ASSISTANT

## 2025-08-13 ENCOUNTER — HOSPITAL ENCOUNTER (OUTPATIENT)
Dept: PHYSICAL THERAPY | Age: 35
Setting detail: THERAPIES SERIES
Discharge: HOME | End: 2025-08-13
Payer: MEDICAID

## 2025-08-13 PROCEDURE — 97110 THERAPEUTIC EXERCISES: CPT

## 2025-08-18 ENCOUNTER — HOSPITAL ENCOUNTER (OUTPATIENT)
Dept: PHYSICAL THERAPY | Age: 35
Setting detail: THERAPIES SERIES
Discharge: HOME | End: 2025-08-18
Payer: MEDICAID

## 2025-08-18 PROCEDURE — 97110 THERAPEUTIC EXERCISES: CPT

## 2025-08-21 ENCOUNTER — HOSPITAL ENCOUNTER (OUTPATIENT)
Dept: MRI IMAGING | Age: 35
Discharge: HOME | End: 2025-08-23
Attending: PHYSICAL MEDICINE & REHABILITATION
Payer: MEDICAID

## 2025-08-21 ENCOUNTER — HOSPITAL ENCOUNTER (OUTPATIENT)
Dept: PHYSICAL THERAPY | Age: 35
Setting detail: THERAPIES SERIES
Discharge: HOME | End: 2025-08-21
Payer: MEDICAID

## 2025-08-21 DIAGNOSIS — R61 HYPERHIDROSIS: ICD-10-CM

## 2025-08-21 DIAGNOSIS — M47.816 LUMBAR FACET JOINT SYNDROME: ICD-10-CM

## 2025-08-21 PROCEDURE — 97110 THERAPEUTIC EXERCISES: CPT

## 2025-08-21 PROCEDURE — 72148 MRI LUMBAR SPINE W/O DYE: CPT

## 2025-08-25 ENCOUNTER — HOSPITAL ENCOUNTER (OUTPATIENT)
Dept: PHYSICAL THERAPY | Age: 35
Setting detail: THERAPIES SERIES
Discharge: HOME | End: 2025-08-25
Payer: MEDICAID

## 2025-09-03 ENCOUNTER — HOSPITAL ENCOUNTER (OUTPATIENT)
Dept: OCCUPATIONAL THERAPY | Age: 35
Setting detail: THERAPIES SERIES
Discharge: HOME | End: 2025-09-03
Payer: MEDICAID

## 2025-09-03 PROCEDURE — 97530 THERAPEUTIC ACTIVITIES: CPT
